# Patient Record
Sex: MALE | Race: WHITE | NOT HISPANIC OR LATINO | Employment: FULL TIME | URBAN - METROPOLITAN AREA
[De-identification: names, ages, dates, MRNs, and addresses within clinical notes are randomized per-mention and may not be internally consistent; named-entity substitution may affect disease eponyms.]

---

## 2023-06-15 ENCOUNTER — OFFICE VISIT (OUTPATIENT)
Dept: PRIMARY CARE | Facility: CLINIC | Age: 48
End: 2023-06-15
Payer: COMMERCIAL

## 2023-06-15 VITALS
HEART RATE: 70 BPM | WEIGHT: 209 LBS | HEIGHT: 69 IN | DIASTOLIC BLOOD PRESSURE: 70 MMHG | BODY MASS INDEX: 30.96 KG/M2 | SYSTOLIC BLOOD PRESSURE: 122 MMHG

## 2023-06-15 DIAGNOSIS — Z12.11 COLON CANCER SCREENING: ICD-10-CM

## 2023-06-15 DIAGNOSIS — Q99.2 FRAGILE-X SYNDROME (HHS-HCC): ICD-10-CM

## 2023-06-15 DIAGNOSIS — Z00.00 ANNUAL PHYSICAL EXAM: Primary | ICD-10-CM

## 2023-06-15 PROCEDURE — 1036F TOBACCO NON-USER: CPT | Performed by: FAMILY MEDICINE

## 2023-06-15 PROCEDURE — 99396 PREV VISIT EST AGE 40-64: CPT | Performed by: FAMILY MEDICINE

## 2023-06-15 ASSESSMENT — PATIENT HEALTH QUESTIONNAIRE - PHQ9
SUM OF ALL RESPONSES TO PHQ9 QUESTIONS 1 AND 2: 0
2. FEELING DOWN, DEPRESSED OR HOPELESS: NOT AT ALL
1. LITTLE INTEREST OR PLEASURE IN DOING THINGS: NOT AT ALL

## 2023-06-15 NOTE — ASSESSMENT & PLAN NOTE
Complete history and physical performed.  I completed your paperwork for the [facility] as you requested.  We ordered Cologuard which will be sent to your house  We ordered updated blood work today and will make treatment recommendations accordingly  Please continue to eat a balanced diet and exercise.

## 2023-06-15 NOTE — PROGRESS NOTES
"Subjective   Patient ID: Jose Enrique Garcia is a 47 y.o. male who presents for Annual Exam.    HPI  1. Physical exam.  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    2. Fragile X syndrome.  Diagnosed at age 18  Prior to his diagnosis, he was thought to have ADHD, but at age 18, blood work revealed a true diagnosis of Fragile X syndrome    3. Hx of Cholecystectomy   Had gallbladder removed 11/2022  No complications with surgery    No recent illness, chest pain, shortness of breath, abdominal pain, nausea, vomiting, diarrhea or urinary symptoms.     Review of Systems  All pertinent positive symptoms are included in the history of present illness.    All other systems have been reviewed and are negative and noncontributory to this patient's current ailments.    No current outpatient medications  No Known Allergies    Immunization History   Administered Date(s) Administered    Influenza, injectable, MDCK, preservative free, quadrivalent 10/01/2017, 10/23/2020, 10/19/2022    Influenza, injectable, quadrivalent 11/12/2019    Influenza, seasonal, injectable 11/01/2012, 10/08/2013, 11/15/2016, 10/01/2017    Pfizer Purple Cap SARS-CoV-2 12/26/2020, 01/16/2021    Tdap 03/31/2022     Past Surgical History:   Procedure Laterality Date    OTHER SURGICAL HISTORY  03/24/2014    Prior Surgical Procedure Not Done     No family history on file.  Social History     Tobacco Use    Smoking status: Never    Smokeless tobacco: Never       Objective   Visit Vitals  /70   Pulse 70   Ht 1.753 m (5' 9\")   Wt 94.8 kg (209 lb)   BMI 30.86 kg/m²   Smoking Status Never   BSA 2.15 m²     Physical Exam  CONSTITUTIONAL - well " nourished, well developed, looks like stated age, in no acute distress, not ill-appearing, and not tired appearing  SKIN - normal skin color and pigmentation, normal skin turgor without rash, lesions, or nodules visualized  HEAD - no trauma, normocephalic  EYES - normal external exam  NECK - supple without rigidity, no neck mass was observed, no thyromegaly or thyroid nodules  CHEST - clear to auscultation, no wheezing, no crackles and no rales, good effort  CARDIAC - regular rate and regular rhythm, no skipped beats, no murmur  ABDOMEN - no organomegaly, soft, nontender, nondistended, normal bowel sounds, no guarding/rebound/rigidity  EXTREMITIES - no edema, no deformities  NEUROLOGICAL - normal gait, normal balance, normal motor, no ataxia, fidgety, wandering gaze  PSYCHIATRIC - alert, pleasant and cordial, age-appropriate  IMMUNOLOGIC - no cervical lymphadenopathy     Assessment/Plan   Problem List Items Addressed This Visit       Fragile-X syndrome     Stable, no medical recommendations at this time         Annual physical exam - Primary     Complete history and physical performed.  I completed your paperwork for the [facility] as you requested.  We ordered Cologuard which will be sent to your house  We ordered updated blood work today and will make treatment recommendations accordingly  Please continue to eat a balanced diet and exercise.          Other Visit Diagnoses       Colon cancer screening        Relevant Orders    Cologuard® colon cancer screening

## 2023-09-27 ENCOUNTER — OFFICE VISIT (OUTPATIENT)
Dept: PRIMARY CARE | Facility: CLINIC | Age: 48
End: 2023-09-27
Payer: COMMERCIAL

## 2023-09-27 VITALS
DIASTOLIC BLOOD PRESSURE: 80 MMHG | SYSTOLIC BLOOD PRESSURE: 126 MMHG | WEIGHT: 218 LBS | HEART RATE: 70 BPM | BODY MASS INDEX: 32.19 KG/M2

## 2023-09-27 DIAGNOSIS — L30.9 ECZEMA, UNSPECIFIED TYPE: Primary | ICD-10-CM

## 2023-09-27 PROCEDURE — 99213 OFFICE O/P EST LOW 20 MIN: CPT | Performed by: FAMILY MEDICINE

## 2023-09-27 PROCEDURE — 1036F TOBACCO NON-USER: CPT | Performed by: FAMILY MEDICINE

## 2023-09-27 RX ORDER — TRIAMCINOLONE ACETONIDE 1 MG/G
CREAM TOPICAL 2 TIMES DAILY
Qty: 453.6 G | Refills: 1 | Status: SHIPPED | OUTPATIENT
Start: 2023-09-27

## 2023-09-27 NOTE — ASSESSMENT & PLAN NOTE
I suspect that you have underlying eczema  Treatment often involves relieving the symptoms and identifying and eliminating the cause if possible  Wear loose, cotton clothing to help absorb perspiration, minimize stress whenever possible  Minimize scratching as scratching worsens eczema, bathe less frequently to avoid excessive skin dryness  When bathing, use special non-fat soaps and tepid water, lubricate the skin immediately after bathing with fragrance free lotions, avoid extreme temperatures changes, and avoid anything that has previously worsened the condition    Ongoing to provide you with a steroid cream to use for acute flares as well

## 2023-09-27 NOTE — PROGRESS NOTES
Subjective   Patient ID: Jose Enrique Garcia is a 48 y.o. male who presents for Eczema.    HPI  DJ has been experiencing intermittent itching of the skin on extensor surfaces, including the forearms, knees/shins, and dorsal surfaces of the feet. Typically, these symptoms resolve on their own, but this time, the itching has been more severe. CARLOS has been scratching, which has resulted in excoriations.    Review of Systems  All pertinent positive symptoms are included in the history of present illness.    All other systems have been reviewed and are negative and noncontributory to this patient's current ailments.     No Known Allergies     Immunization History   Administered Date(s) Administered    Influenza, injectable, MDCK, preservative free, quadrivalent 10/01/2017, 10/23/2020, 10/19/2022    Influenza, injectable, quadrivalent 11/12/2019    Influenza, seasonal, injectable 11/01/2012, 10/08/2013, 11/15/2016, 10/01/2017    Pfizer Purple Cap SARS-CoV-2 12/26/2020, 01/16/2021    Tdap vaccine, age 7 year and older (BOOSTRIX) 03/31/2022       Objective   Vitals:    09/27/23 1027   BP: 126/80   Pulse: 70   Weight: 98.9 kg (218 lb)       Physical Exam  CONSTITUTIONAL - well nourished, well developed, looks like stated age, in no acute distress, not ill-appearing, and not tired appearing  SKIN - Dry skin and excoriations on extensor surfaces of legs, arms and feet  HEAD - no trauma, normocephalic  NECK - supple without rigidity, no neck mass was observed, no thyromegaly or thyroid nodules  CHEST - clear to auscultation, no wheezing, no crackles and no rales, good effort  CARDIAC - regular rate and regular rhythm, no skipped beats, no murmur  NEUROLOGICAL - normal gait, normal balance, normal motor, no ataxia, alert, oriented and no focal signs  PSYCHIATRIC - alert, pleasant and cordial, age-appropriate  IMMUNOLOGIC - no cervical lymphadenopathy     Assessment/Plan   Problem List Items Addressed This Visit       Eczema - Primary      I suspect that you have underlying eczema  Treatment often involves relieving the symptoms and identifying and eliminating the cause if possible  Wear loose, cotton clothing to help absorb perspiration, minimize stress whenever possible  Minimize scratching as scratching worsens eczema, bathe less frequently to avoid excessive skin dryness  When bathing, use special non-fat soaps and tepid water, lubricate the skin immediately after bathing with fragrance free lotions, avoid extreme temperatures changes, and avoid anything that has previously worsened the condition    Ongoing to provide you with a steroid cream to use for acute flares as well         Relevant Medications    triamcinolone (Kenalog) 0.1 % cream

## 2024-02-27 ENCOUNTER — OFFICE VISIT (OUTPATIENT)
Dept: PRIMARY CARE | Facility: CLINIC | Age: 49
End: 2024-02-27
Payer: COMMERCIAL

## 2024-02-27 VITALS
HEART RATE: 75 BPM | HEIGHT: 69 IN | WEIGHT: 216 LBS | SYSTOLIC BLOOD PRESSURE: 122 MMHG | DIASTOLIC BLOOD PRESSURE: 76 MMHG | BODY MASS INDEX: 31.99 KG/M2

## 2024-02-27 DIAGNOSIS — Z00.00 ANNUAL PHYSICAL EXAM: Primary | ICD-10-CM

## 2024-02-27 DIAGNOSIS — Z12.5 PROSTATE CANCER SCREENING: ICD-10-CM

## 2024-02-27 DIAGNOSIS — Q99.2 FRAGILE-X SYNDROME (HHS-HCC): ICD-10-CM

## 2024-02-27 DIAGNOSIS — Z11.59 NEED FOR HEPATITIS C SCREENING TEST: ICD-10-CM

## 2024-02-27 DIAGNOSIS — Z11.4 ENCOUNTER FOR SCREENING FOR HIV: ICD-10-CM

## 2024-02-27 PROCEDURE — 99396 PREV VISIT EST AGE 40-64: CPT | Performed by: FAMILY MEDICINE

## 2024-02-27 PROCEDURE — 1036F TOBACCO NON-USER: CPT | Performed by: FAMILY MEDICINE

## 2024-02-27 PROCEDURE — 93000 ELECTROCARDIOGRAM COMPLETE: CPT | Performed by: FAMILY MEDICINE

## 2024-02-27 ASSESSMENT — PATIENT HEALTH QUESTIONNAIRE - PHQ9
1. LITTLE INTEREST OR PLEASURE IN DOING THINGS: NOT AT ALL
SUM OF ALL RESPONSES TO PHQ9 QUESTIONS 1 AND 2: 0
2. FEELING DOWN, DEPRESSED OR HOPELESS: NOT AT ALL

## 2024-02-27 NOTE — ASSESSMENT & PLAN NOTE
Stable. 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

## 2024-02-27 NOTE — ASSESSMENT & PLAN NOTE
Physical exam performed  Guardianship forms filled out  Immunizations up to date  EKG negative for ACS  Cologuard order placed, please complete

## 2024-02-27 NOTE — PROGRESS NOTES
"Subjective   Patient ID: Jose Enrique Garcia \"JOSE\" is a 48 y.o. male who presents for Annual Exam.    Past Medical, Surgical, and Family History reviewed and updated in chart.    Reviewed all medications by prescribing practitioner or clinical pharmacist (such as prescriptions, OTCs, herbal therapies and supplements) and documented in the medical record.    HPI  1.  Annual physical.  Colonoscopy: due for cologuard, order in EMR  Immunizations: Tdap up to date  Social: denies tobacco or alcohol  Works as a  40 hours per week, and is active in the special olympics    2. Fragile X syndrome.  Diagnosed at age 18  Prior to his diagnosis, he was thought to have ADHD, but at age 18, blood work revealed a true diagnosis of Fragile X syndrome     3. Hx of Cholecystectomy   Had gallbladder removed 11/2022  No complications with surgery     No recent illness, chest pain, shortness of breath, abdominal pain, nausea, vomiting, diarrhea or urinary symptoms.     Review of Systems  All pertinent positive symptoms are included in the history of present illness.    All other systems have been reviewed and are negative and noncontributory to this patient's current ailments.    Past Medical History:   Diagnosis Date    Fragile x chromosome 03/31/2022    Fragile-X syndrome     Past Surgical History:   Procedure Laterality Date    CHOLECYSTECTOMY  11/2022     Social History     Tobacco Use    Smoking status: Never    Smokeless tobacco: Never     No family history on file.  Immunization History   Administered Date(s) Administered    Flu vaccine, quadrivalent, no egg protein, age 6 month or greater (FLUCELVAX) 10/01/2017, 10/23/2020, 10/19/2022    Influenza, injectable, quadrivalent 11/12/2019    Influenza, seasonal, injectable 11/01/2012, 10/08/2013, 11/15/2016, 10/01/2017    Pfizer COVID-19 vaccine, Fall 2023, 12 years and older, (30mcg/0.3mL) 01/31/2024    Pfizer Purple Cap SARS-CoV-2 12/26/2020, 01/16/2021    Tdap vaccine, age 7 " "year and older (BOOSTRIX, ADACEL) 03/31/2022     Current Outpatient Medications   Medication Instructions    triamcinolone (Kenalog) 0.1 % cream Topical, 2 times daily     No Known Allergies    Objective   Vitals:    02/27/24 0926   BP: 122/76   Pulse: 75   Weight: 98 kg (216 lb)   Height: 1.753 m (5' 9\")     Body mass index is 31.9 kg/m².    BP Readings from Last 3 Encounters:   02/27/24 122/76   09/27/23 126/80   06/15/23 122/70      Wt Readings from Last 3 Encounters:   02/27/24 98 kg (216 lb)   09/27/23 98.9 kg (218 lb)   06/15/23 94.8 kg (209 lb)        No visits with results within 1 Month(s) from this visit.   Latest known visit with results is:   Legacy Encounter on 01/30/2018   Component Date Value    TSH 01/30/2018 2.97     Cholesterol 01/30/2018 202 (H)     HDL 01/30/2018 43.8     Cholesterol/HDL Ratio 01/30/2018 4.6     LDL 01/30/2018 136 (H)     VLDL 01/30/2018 22     Triglycerides 01/30/2018 111     WBC 01/30/2018 5.1     nRBC 01/30/2018 0.0     RBC 01/30/2018 5.28     Hemoglobin 01/30/2018 16.5     Hematocrit 01/30/2018 50.8     MCV 01/30/2018 96     MCHC 01/30/2018 32.5     Platelets 01/30/2018 186     RDW 01/30/2018 12.7     Neutrophils % 01/30/2018 61.9     Immature Granulocytes %,* 01/30/2018 0.2     Lymphocytes % 01/30/2018 27.4     Monocytes % 01/30/2018 8.3     Eosinophils % 01/30/2018 1.8     Basophils % 01/30/2018 0.4     Neutrophils Absolute 01/30/2018 3.15     Lymphocytes Absolute 01/30/2018 1.39     Monocytes Absolute 01/30/2018 0.42     Eosinophils Absolute 01/30/2018 0.09     Basophils Absolute 01/30/2018 0.02     Glucose 01/30/2018 100 (H)     Sodium 01/30/2018 140     Potassium 01/30/2018 4.4     Chloride 01/30/2018 104     Bicarbonate 01/30/2018 25     Anion Gap 01/30/2018 15     Urea Nitrogen 01/30/2018 17     Creatinine 01/30/2018 0.84     GLOMERULAR FILTRATION RA* 01/30/2018 >60     GLOMERULAR FILTRATION RA* 01/30/2018 >60     Calcium 01/30/2018 9.5     Albumin 01/30/2018 4.6     " Alkaline Phosphatase 01/30/2018 112     Total Protein 01/30/2018 7.5     AST 01/30/2018 16     Total Bilirubin 01/30/2018 0.5     ALT (SGPT) 01/30/2018 20     PSA 01/30/2018 0.64      Physical Exam  CONSTITUTIONAL - well nourished, well developed, looks like stated age, in no acute distress, not ill-appearing, and not tired appearing  SKIN - normal skin color and pigmentation, normal skin turgor without rash, lesions, or nodules visualized  HEAD - no trauma, normocephalic  EYES - normal external exam  NECK - supple without rigidity, no neck mass was observed, no thyromegaly or thyroid nodules  CHEST - clear to auscultation, no wheezing, no crackles and no rales, good effort  CARDIAC - regular rate and regular rhythm, no skipped beats, no murmur  ABDOMEN - no organomegaly, soft, nontender, nondistended, normal bowel sounds, no guarding/rebound/rigidity  EXTREMITIES - no edema, no deformities  NEUROLOGICAL - normal gait, normal balance, normal motor, no ataxia, fidgety, wandering gaze  PSYCHIATRIC - alert, pleasant and cordial, age-appropriate  IMMUNOLOGIC - no cervical lymphadenopathy    Assessment/Plan   Problem List Items Addressed This Visit       Fragile-X syndrome     Stable. 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         Annual physical exam - Primary     Physical exam performed  Guardianship forms filled out  Immunizations up to date  EKG negative for ACS  Cologuard order placed, please complete         Relevant Orders    Lipid Panel    Prostate Specific Antigen    TSH with reflex to Free T4 if abnormal    Comprehensive Metabolic Panel    CBC and Auto Differential    HIV 1/2 Antigen/Antibody Screen with Reflex to Confirmation    Hepatitis C Antibody    ECG 12 lead (Clinic Performed) (Completed)     Other Visit Diagnoses       Prostate cancer screening        Relevant Orders    Prostate Specific Antigen    Encounter for screening for HIV        Relevant  Orders    HIV 1/2 Antigen/Antibody Screen with Reflex to Confirmation    Need for hepatitis C screening test        Relevant Orders    Hepatitis C Antibody

## 2025-08-06 ENCOUNTER — PATIENT OUTREACH (OUTPATIENT)
Dept: PRIMARY CARE | Facility: CLINIC | Age: 50
End: 2025-08-06
Payer: COMMERCIAL

## 2025-08-06 DIAGNOSIS — Z12.11 SCREENING FOR COLORECTAL CANCER: ICD-10-CM

## 2025-08-06 DIAGNOSIS — Z12.12 SCREENING FOR COLORECTAL CANCER: ICD-10-CM
